# Patient Record
Sex: MALE | Race: BLACK OR AFRICAN AMERICAN | NOT HISPANIC OR LATINO | Employment: UNEMPLOYED | URBAN - METROPOLITAN AREA
[De-identification: names, ages, dates, MRNs, and addresses within clinical notes are randomized per-mention and may not be internally consistent; named-entity substitution may affect disease eponyms.]

---

## 2023-08-17 SDOH — HEALTH STABILITY: PHYSICAL HEALTH: ON AVERAGE, HOW MANY MINUTES DO YOU ENGAGE IN EXERCISE AT THIS LEVEL?: 20 MIN

## 2023-08-17 SDOH — ECONOMIC STABILITY: HOUSING INSECURITY
IN THE LAST 12 MONTHS, WAS THERE A TIME WHEN YOU DID NOT HAVE A STEADY PLACE TO SLEEP OR SLEPT IN A SHELTER (INCLUDING NOW)?: NO

## 2023-08-17 SDOH — ECONOMIC STABILITY: HOUSING INSECURITY
IN THE LAST 12 MONTHS, WAS THERE A TIME WHEN YOU DID NOT HAVE A STEADY PLACE TO SLEEP OR SLEPT IN A SHELTER (INCLUDING NOW)?: YES

## 2023-08-17 SDOH — ECONOMIC STABILITY: INCOME INSECURITY: IN THE LAST 12 MONTHS, WAS THERE A TIME WHEN YOU WERE NOT ABLE TO PAY THE MORTGAGE OR RENT ON TIME?: YES

## 2023-08-17 SDOH — ECONOMIC STABILITY: INCOME INSECURITY: HOW HARD IS IT FOR YOU TO PAY FOR THE VERY BASICS LIKE FOOD, HOUSING, MEDICAL CARE, AND HEATING?: VERY HARD

## 2023-08-17 SDOH — HEALTH STABILITY: PHYSICAL HEALTH: ON AVERAGE, HOW MANY DAYS PER WEEK DO YOU ENGAGE IN MODERATE TO STRENUOUS EXERCISE (LIKE A BRISK WALK)?: 4 DAYS

## 2023-08-17 SDOH — ECONOMIC STABILITY: TRANSPORTATION INSECURITY
IN THE PAST 12 MONTHS, HAS LACK OF TRANSPORTATION KEPT YOU FROM MEETINGS, WORK, OR FROM GETTING THINGS NEEDED FOR DAILY LIVING?: YES

## 2023-08-17 SDOH — ECONOMIC STABILITY: HOUSING INSECURITY: IN THE LAST 12 MONTHS, HOW MANY PLACES HAVE YOU LIVED?: 3

## 2023-08-17 SDOH — ECONOMIC STABILITY: TRANSPORTATION INSECURITY
IN THE PAST 12 MONTHS, HAS THE LACK OF TRANSPORTATION KEPT YOU FROM MEDICAL APPOINTMENTS OR FROM GETTING MEDICATIONS?: YES

## 2023-08-17 SDOH — ECONOMIC STABILITY: FOOD INSECURITY: WITHIN THE PAST 12 MONTHS, YOU WORRIED THAT YOUR FOOD WOULD RUN OUT BEFORE YOU GOT MONEY TO BUY MORE.: OFTEN TRUE

## 2023-08-17 SDOH — HEALTH STABILITY: MENTAL HEALTH
STRESS IS WHEN SOMEONE FEELS TENSE, NERVOUS, ANXIOUS, OR CAN'T SLEEP AT NIGHT BECAUSE THEIR MIND IS TROUBLED. HOW STRESSED ARE YOU?: TO SOME EXTENT

## 2023-08-17 SDOH — ECONOMIC STABILITY: TRANSPORTATION INSECURITY
IN THE PAST 12 MONTHS, HAS LACK OF RELIABLE TRANSPORTATION KEPT YOU FROM MEDICAL APPOINTMENTS, MEETINGS, WORK OR FROM GETTING THINGS NEEDED FOR DAILY LIVING?: YES

## 2023-08-17 SDOH — ECONOMIC STABILITY: FOOD INSECURITY: WITHIN THE PAST 12 MONTHS, THE FOOD YOU BOUGHT JUST DIDN'T LAST AND YOU DIDN'T HAVE MONEY TO GET MORE.: OFTEN TRUE

## 2023-08-17 ASSESSMENT — SOCIAL DETERMINANTS OF HEALTH (SDOH)
IN A TYPICAL WEEK, HOW MANY TIMES DO YOU TALK ON THE PHONE WITH FAMILY, FRIENDS, OR NEIGHBORS?: MORE THAN THREE TIMES A WEEK
HOW OFTEN DO YOU GET TOGETHER WITH FRIENDS OR RELATIVES?: THREE TIMES A WEEK
HOW OFTEN DO YOU ATTENT MEETINGS OF THE CLUB OR ORGANIZATION YOU BELONG TO?: NEVER
HOW OFTEN DO YOU ATTEND CHURCH OR RELIGIOUS SERVICES?: 1 TO 4 TIMES PER YEAR
HOW OFTEN DO YOU GET TOGETHER WITH FRIENDS OR RELATIVES?: THREE TIMES A WEEK
HOW OFTEN DO YOU ATTEND CHURCH OR RELIGIOUS SERVICES?: 1 TO 4 TIMES PER YEAR
HOW MANY DRINKS CONTAINING ALCOHOL DO YOU HAVE ON A TYPICAL DAY WHEN YOU ARE DRINKING: PATIENT DOES NOT DRINK
WITHIN THE PAST 12 MONTHS, YOU WORRIED THAT YOUR FOOD WOULD RUN OUT BEFORE YOU GOT THE MONEY TO BUY MORE: OFTEN TRUE
HOW OFTEN DO YOU ATTENT MEETINGS OF THE CLUB OR ORGANIZATION YOU BELONG TO?: NEVER
ARE YOU MARRIED, WIDOWED, DIVORCED, SEPARATED, NEVER MARRIED, OR LIVING WITH A PARTNER?: NEVER MARRIED
ARE YOU MARRIED, WIDOWED, DIVORCED, SEPARATED, NEVER MARRIED, OR LIVING WITH A PARTNER?: NEVER MARRIED
DO YOU BELONG TO ANY CLUBS OR ORGANIZATIONS SUCH AS CHURCH GROUPS UNIONS, FRATERNAL OR ATHLETIC GROUPS, OR SCHOOL GROUPS?: NO
HOW HARD IS IT FOR YOU TO PAY FOR THE VERY BASICS LIKE FOOD, HOUSING, MEDICAL CARE, AND HEATING?: VERY HARD
DO YOU BELONG TO ANY CLUBS OR ORGANIZATIONS SUCH AS CHURCH GROUPS UNIONS, FRATERNAL OR ATHLETIC GROUPS, OR SCHOOL GROUPS?: NO
HOW OFTEN DO YOU HAVE A DRINK CONTAINING ALCOHOL: NEVER
IN A TYPICAL WEEK, HOW MANY TIMES DO YOU TALK ON THE PHONE WITH FAMILY, FRIENDS, OR NEIGHBORS?: MORE THAN THREE TIMES A WEEK
HOW OFTEN DO YOU HAVE SIX OR MORE DRINKS ON ONE OCCASION: NEVER

## 2023-08-17 ASSESSMENT — LIFESTYLE VARIABLES
HOW MANY STANDARD DRINKS CONTAINING ALCOHOL DO YOU HAVE ON A TYPICAL DAY: PATIENT DOES NOT DRINK
HOW OFTEN DO YOU HAVE SIX OR MORE DRINKS ON ONE OCCASION: NEVER
SKIP TO QUESTIONS 9-10: 1
HOW OFTEN DO YOU HAVE A DRINK CONTAINING ALCOHOL: NEVER
AUDIT-C TOTAL SCORE: 0

## 2023-08-18 ENCOUNTER — PHARMACY VISIT (OUTPATIENT)
Dept: PHARMACY | Facility: MEDICAL CENTER | Age: 63
End: 2023-08-18
Payer: COMMERCIAL

## 2023-08-18 ENCOUNTER — OFFICE VISIT (OUTPATIENT)
Dept: MEDICAL GROUP | Facility: MEDICAL CENTER | Age: 63
End: 2023-08-18
Attending: NURSE PRACTITIONER
Payer: MEDICAID

## 2023-08-18 VITALS
TEMPERATURE: 97.7 F | RESPIRATION RATE: 16 BRPM | SYSTOLIC BLOOD PRESSURE: 132 MMHG | WEIGHT: 238.9 LBS | HEART RATE: 68 BPM | BODY MASS INDEX: 32.36 KG/M2 | HEIGHT: 72 IN | OXYGEN SATURATION: 96 % | DIASTOLIC BLOOD PRESSURE: 72 MMHG

## 2023-08-18 DIAGNOSIS — Z12.11 COLON CANCER SCREENING: ICD-10-CM

## 2023-08-18 DIAGNOSIS — Z13.29 SCREENING FOR ENDOCRINE, NUTRITIONAL, METABOLIC AND IMMUNITY DISORDER: ICD-10-CM

## 2023-08-18 DIAGNOSIS — Z13.0 SCREENING FOR ENDOCRINE, NUTRITIONAL, METABOLIC AND IMMUNITY DISORDER: ICD-10-CM

## 2023-08-18 DIAGNOSIS — I15.9 SECONDARY HYPERTENSION: ICD-10-CM

## 2023-08-18 DIAGNOSIS — M54.9 CHRONIC BACK PAIN, UNSPECIFIED BACK LOCATION, UNSPECIFIED BACK PAIN LATERALITY: ICD-10-CM

## 2023-08-18 DIAGNOSIS — Z11.3 ROUTINE SCREENING FOR STI (SEXUALLY TRANSMITTED INFECTION): ICD-10-CM

## 2023-08-18 DIAGNOSIS — E11.65 TYPE 2 DIABETES MELLITUS WITH HYPERGLYCEMIA, WITHOUT LONG-TERM CURRENT USE OF INSULIN (HCC): ICD-10-CM

## 2023-08-18 DIAGNOSIS — I50.9 HEART FAILURE, UNSPECIFIED HF CHRONICITY, UNSPECIFIED HEART FAILURE TYPE (HCC): ICD-10-CM

## 2023-08-18 DIAGNOSIS — Z76.89 ENCOUNTER TO ESTABLISH CARE: ICD-10-CM

## 2023-08-18 DIAGNOSIS — Z13.228 SCREENING FOR ENDOCRINE, NUTRITIONAL, METABOLIC AND IMMUNITY DISORDER: ICD-10-CM

## 2023-08-18 DIAGNOSIS — I25.2 MI, OLD: ICD-10-CM

## 2023-08-18 DIAGNOSIS — Z12.5 PROSTATE CANCER SCREENING: ICD-10-CM

## 2023-08-18 DIAGNOSIS — Z11.59 NEED FOR HEPATITIS C SCREENING TEST: ICD-10-CM

## 2023-08-18 DIAGNOSIS — Z13.6 SCREENING FOR CARDIOVASCULAR CONDITION: ICD-10-CM

## 2023-08-18 DIAGNOSIS — G89.29 CHRONIC BACK PAIN, UNSPECIFIED BACK LOCATION, UNSPECIFIED BACK PAIN LATERALITY: ICD-10-CM

## 2023-08-18 DIAGNOSIS — E78.5 HYPERLIPIDEMIA, UNSPECIFIED HYPERLIPIDEMIA TYPE: ICD-10-CM

## 2023-08-18 DIAGNOSIS — Z13.21 SCREENING FOR ENDOCRINE, NUTRITIONAL, METABOLIC AND IMMUNITY DISORDER: ICD-10-CM

## 2023-08-18 PROCEDURE — RXMED WILLOW AMBULATORY MEDICATION CHARGE: Performed by: NURSE PRACTITIONER

## 2023-08-18 PROCEDURE — 99204 OFFICE O/P NEW MOD 45 MIN: CPT | Performed by: NURSE PRACTITIONER

## 2023-08-18 PROCEDURE — 3078F DIAST BP <80 MM HG: CPT | Performed by: NURSE PRACTITIONER

## 2023-08-18 PROCEDURE — 99202 OFFICE O/P NEW SF 15 MIN: CPT | Performed by: NURSE PRACTITIONER

## 2023-08-18 PROCEDURE — 3075F SYST BP GE 130 - 139MM HG: CPT | Performed by: NURSE PRACTITIONER

## 2023-08-18 RX ORDER — METFORMIN HYDROCHLORIDE 500 MG/1
500 TABLET, EXTENDED RELEASE ORAL DAILY
COMMUNITY
End: 2023-08-18

## 2023-08-18 RX ORDER — ATORVASTATIN CALCIUM 20 MG/1
40 TABLET, FILM COATED ORAL NIGHTLY
COMMUNITY
End: 2023-08-18 | Stop reason: SDUPTHER

## 2023-08-18 RX ORDER — CARVEDILOL 12.5 MG/1
12.5 TABLET ORAL 2 TIMES DAILY WITH MEALS
Qty: 60 TABLET | Refills: 1 | Status: SHIPPED | OUTPATIENT
Start: 2023-08-18

## 2023-08-18 RX ORDER — MELOXICAM 7.5 MG/1
15 TABLET ORAL DAILY
Qty: 30 TABLET | Refills: 1 | Status: SHIPPED | OUTPATIENT
Start: 2023-08-18 | End: 2023-12-05

## 2023-08-18 RX ORDER — CARVEDILOL 12.5 MG/1
12.5 TABLET ORAL 2 TIMES DAILY WITH MEALS
COMMUNITY
End: 2023-08-18 | Stop reason: SDUPTHER

## 2023-08-18 RX ORDER — ATORVASTATIN CALCIUM 40 MG/1
40 TABLET, FILM COATED ORAL NIGHTLY
Qty: 15 TABLET | Refills: 1 | Status: SHIPPED | OUTPATIENT
Start: 2023-08-18

## 2023-08-18 RX ORDER — FUROSEMIDE 40 MG/1
40 TABLET ORAL DAILY
COMMUNITY
End: 2023-08-18 | Stop reason: SDUPTHER

## 2023-08-18 RX ORDER — POTASSIUM CHLORIDE 20 MEQ/1
20 TABLET, EXTENDED RELEASE ORAL 2 TIMES DAILY
Qty: 60 TABLET | Refills: 1 | Status: SHIPPED | OUTPATIENT
Start: 2023-08-18

## 2023-08-18 RX ORDER — MELOXICAM 7.5 MG/1
15 TABLET ORAL DAILY
COMMUNITY
End: 2023-08-18 | Stop reason: SDUPTHER

## 2023-08-18 RX ORDER — FUROSEMIDE 40 MG/1
40 TABLET ORAL DAILY
Qty: 30 TABLET | Refills: 1 | Status: SHIPPED | OUTPATIENT
Start: 2023-08-18

## 2023-08-18 RX ORDER — POTASSIUM CHLORIDE 20 MEQ/1
20 TABLET, EXTENDED RELEASE ORAL 2 TIMES DAILY
COMMUNITY
End: 2023-08-18 | Stop reason: SDUPTHER

## 2023-08-18 RX ORDER — EMPAGLIFLOZIN 25 MG/1
1 TABLET, FILM COATED ORAL DAILY
Qty: 30 TABLET | Refills: 2 | Status: SHIPPED | OUTPATIENT
Start: 2023-08-18 | End: 2023-12-29 | Stop reason: SDUPTHER

## 2023-08-18 ASSESSMENT — PATIENT HEALTH QUESTIONNAIRE - PHQ9: CLINICAL INTERPRETATION OF PHQ2 SCORE: 0

## 2023-08-18 NOTE — LETTER
Formerly [facility]  EDWIN GuerreroRSIDDHARTHA.  [address]  [place] [address]  Fax: [phone]   Authorization for Release/Disclosure of   Protected Health Information   Name: BONIFACIO URBINA : 1960 SSN: [id number]   Address: [address] [place] [address] [address]  [facility] [address] Phone:    [phone] (home)    I authorize the entity listed below to release/disclose the PHI below to:   [facility]/YEE Guerrero.RTERRI and THA Guerrero   Provider or Entity Name:     Address   City, State, [facility] Phone:  [phone]    Fax:     Reason for request: continuity of care   Information to be released:    [  ] LAST COLONOSCOPY,  including any PATH REPORT and follow-up  [  ] LAST FIT/COLOGUARD RESULT [  ] LAST DEXA  [  ] LAST MAMMOGRAM  [  ] LAST PAP  [  ] LAST LABS [  ] RETINA EXAM REPORT  [  ] IMMUNIZATION RECORDS  [  ] Release all info      [  ] Check here and initial the line next to each item to release ALL health information INCLUDING  _____ Care and treatment for drug and / or alcohol abuse  _____ HIV testing, infection status, or AIDS  _____ Genetic Testing    DATES OF SERVICE OR TIME PERIOD TO BE DISCLOSED: _____________  I understand and acknowledge that:  * This Authorization may be revoked at any time by you in writing, except if your health information has already been used or disclosed.  * Your health information that will be used or disclosed as a result of you signing this authorization could be re-disclosed by the recipient. If this occurs, your re-disclosed health information may no longer be protected by State or Federal laws.  * You may refuse to sign this Authorization. Your refusal will not affect your ability to obtain treatment.  * This Authorization becomes effective upon signing and will  on (date) __________.      If no date is indicated, this Authorization will  one (1) year from the signature date.    Name: Bonifacio Urbina  Signature: Date:    8/18/2023     PLEASE FAX REQUESTED RECORDS BACK TO: [phone]

## 2023-08-18 NOTE — LETTER
Formerly [facility]  EDWIN GuerreroRSIDDHARTHA.  [address]  [place] [address]  Fax: [phone]   Authorization for Release/Disclosure of   Protected Health Information   Name: BONIFACIO URBINA : 1960 SSN: [id number]   Address: [address] [place] [address] [address]  [facility] [address] Phone:    [phone] (home)    I authorize the entity listed below to release/disclose the PHI below to:   [facility]/ARJUN Guerrero.P.RTERRI and THA Guerrero   Provider or Entity Name:  Dr. Wright - Cardiology    Address   City, [facility], [place], [place]  Phone:      Fax:     Reason for request: continuity of care   Information to be released:    [  ] LAST COLONOSCOPY,  including any PATH REPORT and follow-up  [  ] LAST FIT/COLOGUARD RESULT [  ] LAST DEXA  [  ] LAST MAMMOGRAM  [  ] LAST PAP  [  ] LAST LABS [  ] RETINA EXAM REPORT  [  ] IMMUNIZATION RECORDS  [  ] Release all info      [  ] Check here and initial the line next to each item to release ALL health information INCLUDING  _____ Care and treatment for drug and / or alcohol abuse  _____ HIV testing, infection status, or AIDS  _____ Genetic Testing    DATES OF SERVICE OR TIME PERIOD TO BE DISCLOSED: _____________  I understand and acknowledge that:  * This Authorization may be revoked at any time by you in writing, except if your health information has already been used or disclosed.  * Your health information that will be used or disclosed as a result of you signing this authorization could be re-disclosed by the recipient. If this occurs, your re-disclosed health information may no longer be protected by State or Federal laws.  * You may refuse to sign this Authorization. Your refusal will not affect your ability to obtain treatment.  * This Authorization becomes effective upon signing and will  on (date) __________.      If no date is indicated, this Authorization will  one (1) year from the signature date.    Name: Bonifacio RENTERIA  Bryan  Signature: Date:   8/18/2023     PLEASE FAX REQUESTED RECORDS BACK TO: [phone]

## 2023-08-18 NOTE — LETTER
[facility]  EDWIN GuerreroRSIDDHARTHA.  [address]  [place] [address]  Fax: [phone]   Authorization for Release/Disclosure of   Protected Health Information   Name: BONIFACIO URBINA : 1960 SSN: [id number]   Address: [address] [place] [address] [address]  [place] [address] Phone:    [phone] (home)    I authorize the entity listed below to release/disclose the PHI below to:   [facility]/ARJUN Guerrero.HARINDER.RTERRI and THA Guerrero   Provider or Entity Name:  ROSARIO Eugene   Address   City, [facility] Phone:  [phone]    Fax:     Reason for request: continuity of care   Information to be released:    [  ] LAST COLONOSCOPY,  including any PATH REPORT and follow-up  [  ] LAST FIT/COLOGUARD RESULT [  ] LAST DEXA  [  ] LAST MAMMOGRAM  [  ] LAST PAP  [  ] LAST LABS [  ] RETINA EXAM REPORT  [  ] IMMUNIZATION RECORDS  [  ] Release all info      [  ] Check here and initial the line next to each item to release ALL health information INCLUDING  _____ Care and treatment for drug and / or alcohol abuse  _____ HIV testing, infection status, or AIDS  _____ Genetic Testing    DATES OF SERVICE OR TIME PERIOD TO BE DISCLOSED: _____________  I understand and acknowledge that:  * This Authorization may be revoked at any time by you in writing, except if your health information has already been used or disclosed.  * Your health information that will be used or disclosed as a result of you signing this authorization could be re-disclosed by the recipient. If this occurs, your re-disclosed health information may no longer be protected by State or Federal laws.  * You may refuse to sign this Authorization. Your refusal will not affect your ability to obtain treatment.  * This Authorization becomes effective upon signing and will  on (date) __________.      If no date is indicated, this Authorization will  one (1) year from the signature date.    Name: Bonifacio Urbina  Signature:  Date:   8/18/2023     PLEASE FAX REQUESTED RECORDS BACK TO: [phone]

## 2023-08-28 ENCOUNTER — TELEPHONE (OUTPATIENT)
Dept: CARDIOLOGY | Facility: MEDICAL CENTER | Age: 63
End: 2023-08-28
Payer: MEDICAID

## 2023-08-28 NOTE — TELEPHONE ENCOUNTER
Spoke to patient in regards to records for NP appointment with [facility].     Patient has seen a cardiologist before?  Yes   If yes, where?: [facility]    Any recent cardiac testing outside of [facility]?  YES   What testing: EKG, ECHO   Where was it completed?: [facility], PATIENT NOT SURE  Were any records requested?  Yes   Fax:

## 2023-08-30 PROCEDURE — RXMED WILLOW AMBULATORY MEDICATION CHARGE: Performed by: NURSE PRACTITIONER

## 2023-08-31 ENCOUNTER — HOSPITAL ENCOUNTER (OUTPATIENT)
Dept: LAB | Facility: MEDICAL CENTER | Age: 63
End: 2023-08-31
Attending: NURSE PRACTITIONER
Payer: MEDICAID

## 2023-08-31 DIAGNOSIS — Z12.5 PROSTATE CANCER SCREENING: ICD-10-CM

## 2023-08-31 DIAGNOSIS — Z13.228 SCREENING FOR ENDOCRINE, NUTRITIONAL, METABOLIC AND IMMUNITY DISORDER: ICD-10-CM

## 2023-08-31 DIAGNOSIS — Z13.21 SCREENING FOR ENDOCRINE, NUTRITIONAL, METABOLIC AND IMMUNITY DISORDER: ICD-10-CM

## 2023-08-31 DIAGNOSIS — E11.65 TYPE 2 DIABETES MELLITUS WITH HYPERGLYCEMIA, WITHOUT LONG-TERM CURRENT USE OF INSULIN (HCC): ICD-10-CM

## 2023-08-31 DIAGNOSIS — Z11.59 NEED FOR HEPATITIS C SCREENING TEST: ICD-10-CM

## 2023-08-31 DIAGNOSIS — Z13.29 SCREENING FOR ENDOCRINE, NUTRITIONAL, METABOLIC AND IMMUNITY DISORDER: ICD-10-CM

## 2023-08-31 DIAGNOSIS — Z13.0 SCREENING FOR ENDOCRINE, NUTRITIONAL, METABOLIC AND IMMUNITY DISORDER: ICD-10-CM

## 2023-08-31 DIAGNOSIS — Z11.3 ROUTINE SCREENING FOR STI (SEXUALLY TRANSMITTED INFECTION): ICD-10-CM

## 2023-08-31 LAB
25(OH)D3 SERPL-MCNC: 14 NG/ML (ref 30–100)
EST. AVERAGE GLUCOSE BLD GHB EST-MCNC: 160 MG/DL
HBA1C MFR BLD: 7.2 % (ref 4–5.6)
HCV AB SER QL: REACTIVE
HIV 1+2 AB+HIV1 P24 AG SERPL QL IA: NORMAL
PSA SERPL-MCNC: 0.35 NG/ML (ref 0–4)
T4 FREE SERPL-MCNC: 1.19 NG/DL (ref 0.93–1.7)
TSH SERPL DL<=0.005 MIU/L-ACNC: 2.48 UIU/ML (ref 0.38–5.33)

## 2023-08-31 PROCEDURE — 86803 HEPATITIS C AB TEST: CPT

## 2023-08-31 PROCEDURE — 84443 ASSAY THYROID STIM HORMONE: CPT

## 2023-08-31 PROCEDURE — 36415 COLL VENOUS BLD VENIPUNCTURE: CPT

## 2023-08-31 PROCEDURE — 84153 ASSAY OF PSA TOTAL: CPT

## 2023-08-31 PROCEDURE — 87389 HIV-1 AG W/HIV-1&-2 AB AG IA: CPT

## 2023-08-31 PROCEDURE — 84439 ASSAY OF FREE THYROXINE: CPT

## 2023-08-31 PROCEDURE — 83036 HEMOGLOBIN GLYCOSYLATED A1C: CPT

## 2023-08-31 PROCEDURE — 87522 HEPATITIS C REVRS TRNSCRPJ: CPT

## 2023-08-31 PROCEDURE — 82306 VITAMIN D 25 HYDROXY: CPT

## 2023-09-01 ENCOUNTER — PHARMACY VISIT (OUTPATIENT)
Dept: PHARMACY | Facility: MEDICAL CENTER | Age: 63
End: 2023-09-01
Payer: COMMERCIAL

## 2023-09-01 ENCOUNTER — OFFICE VISIT (OUTPATIENT)
Dept: CARDIOLOGY | Facility: MEDICAL CENTER | Age: 63
End: 2023-09-01
Attending: NURSE PRACTITIONER
Payer: MEDICAID

## 2023-09-01 VITALS
RESPIRATION RATE: 16 BRPM | HEIGHT: 72 IN | DIASTOLIC BLOOD PRESSURE: 82 MMHG | WEIGHT: 240 LBS | BODY MASS INDEX: 32.51 KG/M2 | HEART RATE: 68 BPM | SYSTOLIC BLOOD PRESSURE: 128 MMHG | OXYGEN SATURATION: 96 %

## 2023-09-01 DIAGNOSIS — I50.20 HEART FAILURE WITH REDUCED EJECTION FRACTION (HCC): ICD-10-CM

## 2023-09-01 DIAGNOSIS — I10 ESSENTIAL HYPERTENSION, BENIGN: ICD-10-CM

## 2023-09-01 DIAGNOSIS — E78.5 DYSLIPIDEMIA: ICD-10-CM

## 2023-09-01 DIAGNOSIS — E55.9 VITAMIN D DEFICIENCY: ICD-10-CM

## 2023-09-01 DIAGNOSIS — I25.10 CORONARY ARTERY DISEASE INVOLVING NATIVE CORONARY ARTERY OF NATIVE HEART WITHOUT ANGINA PECTORIS: ICD-10-CM

## 2023-09-01 DIAGNOSIS — I50.9 CONGESTIVE HEART FAILURE, UNSPECIFIED HF CHRONICITY, UNSPECIFIED HEART FAILURE TYPE (HCC): ICD-10-CM

## 2023-09-01 PROCEDURE — 99212 OFFICE O/P EST SF 10 MIN: CPT | Performed by: INTERNAL MEDICINE

## 2023-09-01 PROCEDURE — 94618 PULMONARY STRESS TESTING: CPT | Performed by: INTERNAL MEDICINE

## 2023-09-01 PROCEDURE — RXMED WILLOW AMBULATORY MEDICATION CHARGE

## 2023-09-01 PROCEDURE — 3074F SYST BP LT 130 MM HG: CPT | Performed by: INTERNAL MEDICINE

## 2023-09-01 PROCEDURE — 94618 PULMONARY STRESS TESTING: CPT | Mod: 26 | Performed by: INTERNAL MEDICINE

## 2023-09-01 PROCEDURE — 99214 OFFICE O/P EST MOD 30 MIN: CPT | Mod: 25 | Performed by: INTERNAL MEDICINE

## 2023-09-01 PROCEDURE — 99204 OFFICE O/P NEW MOD 45 MIN: CPT | Mod: 25 | Performed by: INTERNAL MEDICINE

## 2023-09-01 PROCEDURE — 93005 ELECTROCARDIOGRAM TRACING: CPT | Mod: XE | Performed by: INTERNAL MEDICINE

## 2023-09-01 PROCEDURE — 3079F DIAST BP 80-89 MM HG: CPT | Performed by: INTERNAL MEDICINE

## 2023-09-01 RX ORDER — ERGOCALCIFEROL 1.25 MG/1
50000 CAPSULE ORAL
Qty: 12 CAPSULE | Refills: 0 | Status: SHIPPED | OUTPATIENT
Start: 2023-09-01 | End: 2023-12-05

## 2023-09-01 RX ORDER — SACUBITRIL AND VALSARTAN 49; 51 MG/1; MG/1
TABLET, FILM COATED ORAL
COMMUNITY
Start: 2023-08-03

## 2023-09-01 ASSESSMENT — MINNESOTA LIVING WITH HEART FAILURE QUESTIONNAIRE (MLHF)
EATING LESS FOODS YOU LIKE: 5
WALKING ABOUT OR CLIMBING STAIRS DIFFICULT: 4
DIFFICULTY GOING AWAY FROM HOME: 4
DIFFICULTY WITH SEXUAL ACTIVITIES: 5
LOSS OF SELF CONTROL IN YOUR LIFE: 5
SWELLING IN ANKLES OR LEGS: 4
DIFFICULTY SLEEPING WELL AT NIGHT: 2
TIRED, FATIGUED OR LOW ON ENERGY: 3
DIFFICULTY WITH RECREATIONAL PASTIMES, SPORTS, HOBBIES: 5
DIFFICULTY WORKING TO EARN A LIVING: 5
MAKING YOU STAY IN A HOSPITAL: 1
HAVING TO SIT OR LIE DOWN DURING THE DAY: 4
FEELING LIKE A BURDEN TO FAMILY AND FRIENDS: 4
DIFFICULTY TO CONCENTRATE OR REMEMBERING THINGS: 4
TOTAL_SCORE: 76
MAKING YOU WORRY: 5
MAKING YOU SHORT OF BREATH: 2
GIVING YOU SIDE EFFECTS FROM TREATMENTS: 4
WORKING AROUND THE HOUSE OR YARD DIFFICULT: 4
COSTING YOU MONEY FOR MEDICAL CARE: 0
DIFFICULTY SOCIALIZING WITH FAMILY OR FRIENDS: 2
MAKING YOU FEEL DEPRESSED: 4

## 2023-09-01 ASSESSMENT — 6 MINUTE WALK TEST (6MWT): TOTAL DISTANCE WALKED (METERS): 299
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
## 2023-09-01 NOTE — RESULT ENCOUNTER NOTE
Edis Valdovinos,  Your labs show that your vitamin D level is low.  Your current level is 14 and we consider normal to be 30 or above. I recommend you start taking a prescription high dose vitamin D supplement once a week.  I have sent this over to your pharmacy for you.     Your Hemoglobin A1c was 7.2, please continue to take you Jardiance as prescribed.    Your Hepatitis C Virus Antibody screening resulted as reactive. Which means that you have been exposed to the virus at some point. The testing on you blood is still in process to determine if your body has cleared the virus or if its an active infection. The results should be back in time for your appointment on 9/18/2023.    If you have any additional questions please follow up with you PCP.     YAN Simpson.

## 2023-09-02 NOTE — ASSESSMENT & PLAN NOTE
Regarding his coronary artery disease he remains on appropriate medical therapy.  No changes are made to his medical therapy at this time.  Patient is taking an aspirin therapy at this time

## 2023-09-02 NOTE — ASSESSMENT & PLAN NOTE
Clinically, he is doing fair and is well compensated in terms of heart failure with reduced ejection fraction.  He notes that he had a prior myocardial infarction back in March 2022 and has not had further imaging.  I have recommended that he undergo an echocardiogram given that he remains on goal-directed medical therapy for his presumptive heart failure with reduced ejection fraction.  We will try and procure records from [facility] regarding his prior myocardial infarction echocardiogram.  He is amenable to his current course of care and further recommendations will follow.  He will continue his current medications at scheduled dosing.  It would not be unreasonable to consider addition of spironolactone pending his echocardiogram.

## 2023-09-02 NOTE — ASSESSMENT & PLAN NOTE
Regarding his essential hypertension no changes were made to his medical therapy given that his blood pressures currently stable

## 2023-09-02 NOTE — ASSESSMENT & PLAN NOTE
Recommend continuation of moderate intensity statin therapy given known history of nonobstructive coronary artery disease

## 2023-09-03 LAB
HCV RNA SERPL NAA+PROBE-ACNC: NOT DETECTED IU/ML
HCV RNA SERPL NAA+PROBE-LOG IU: NOT DETECTED LOG IU/ML
HCV RNA SERPL QL NAA+PROBE: NOT DETECTED

## 2023-09-04 PROBLEM — Z76.89 ENCOUNTER TO ESTABLISH CARE: Status: ACTIVE | Noted: 2023-09-04

## 2023-09-04 LAB — EKG IMPRESSION: NORMAL

## 2023-09-04 PROCEDURE — 93010 ELECTROCARDIOGRAM REPORT: CPT | Performed by: INTERNAL MEDICINE

## 2023-09-04 NOTE — PROGRESS NOTES
Chief Complaint   Patient presents with    Establish Care       Subjective:     HPI:   Bonifacio Adams is a 62 y.o. male here to discuss the evaluation and management of:        Problem   Encounter to Establish Care    Patient here to establish care.  Has known history of coronary artery disease, dyslipidemia, hypertension, and heart failure with reduced ejection fraction.  Patient states its been a little while since he seen a cardiologist.  Patient was previously receiving care in [place] and is now moved to this area.  Patient is requesting refills on his medications.         ROS  See HPI       Allergies   Allergen Reactions    Sulfa Drugs        Current medicines (including changes today)  Current Outpatient Medications   Medication Sig Dispense Refill    atorvastatin (LIPITOR) 40 MG Tab Take 1 Tablet by mouth every evening. 15 Tablet 1    carvedilol (COREG) 12.5 MG Tab Take 1 Tablet by mouth 2 times a day with meals. 60 Tablet 1    furosemide (LASIX) 40 MG Tab Take 1 Tablet by mouth every day. 30 Tablet 1    meloxicam (MOBIC) 7.5 MG Tab Take 2 Tablets by mouth every day. 30 Tablet 1    Empagliflozin (JARDIANCE) 25 MG Tab Take 1 Tablet by mouth every day. 30 Tablet 2    potassium chloride SA (KDUR) 20 MEQ Tab CR Take 1 Tablet by mouth 2 times a day. 60 Tablet 1    ergocalciferol (DRISDOL) 62547 UNIT capsule Take 1 Capsule by mouth every 7 days. (Patient not taking: Reported on 9/1/2023) 12 Capsule 0    ENTRESTO 49-51 MG Tab        No current facility-administered medications for this visit.       Social History     Tobacco Use    Smoking status: Former     Types: Cigarettes    Smokeless tobacco: Former   Vaping Use    Vaping Use: Former   Substance Use Topics    Alcohol use: Not Currently    Drug use: Never       Patient Active Problem List    Diagnosis Date Noted    Encounter to establish care 09/04/2023    Heart failure with reduced ejection fraction (HCC) 09/01/2023    Coronary artery disease involving  native coronary artery of native heart without angina pectoris 09/01/2023    Essential hypertension, benign 09/01/2023    Dyslipidemia 09/01/2023       Family History   Problem Relation Age of Onset    Diabetes Mother     Diabetes Sister           Objective:     /72 (BP Location: Left arm, Patient Position: Sitting, BP Cuff Size: Adult)   Pulse 68   Temp 36.5 °C (97.7 °F) (Temporal)   Resp 16   Ht 1.829 m (6')   Wt 108 kg (238 lb 14.4 oz)   SpO2 96%  Body mass index is 32.4 kg/m².    Physical Exam:  Physical Exam  Vitals reviewed.   Constitutional:       General: He is awake.      Appearance: Normal appearance. He is well-developed.   HENT:      Head: Normocephalic.   Eyes:      Conjunctiva/sclera: Conjunctivae normal.   Cardiovascular:      Rate and Rhythm: Normal rate and regular rhythm.      Heart sounds: Normal heart sounds.   Pulmonary:      Effort: Pulmonary effort is normal. No respiratory distress.      Breath sounds: Normal breath sounds. No wheezing.   Musculoskeletal:      Cervical back: Neck supple.   Skin:     General: Skin is warm and dry.   Neurological:      Mental Status: He is alert and oriented to person, place, and time.   Psychiatric:         Mood and Affect: Mood normal.         Behavior: Behavior normal. Behavior is cooperative.         Assessment and Plan:     The following treatment plan was discussed:    Problem List Items Addressed This Visit       Encounter to establish care     Discussed health history and maintenance   Flu vaccine - Not available   Colon Ca screening - Referral to GI for Colonoscopy  Preventative screening labs ordered -patient to go have his labs completed  We will refill current medications for 1 month only until we can get updated labs and patient referred to cardiology.  Referral to cardiology placed  Blood pressures well controlled in office today so we will continue with current medications  Medical records request has been sent to his [place]  providers  Patient return in 1 month to go over labs and adjust medications as necessary.              Other Visit Diagnoses       Colon cancer screening        Relevant Orders    OCCULT BLOOD FECES IMMUNOASSAY    Routine screening for STI (sexually transmitted infection)        Relevant Orders    HIV AG/AB COMBO ASSAY SCREENING (Completed)    Need for hepatitis C screening test        Relevant Orders    HEP C VIRUS ANTIBODY (Completed)    Screening for cardiovascular condition        Screening for endocrine, nutritional, metabolic and immunity disorder        Relevant Orders    VITAMIN D,25 HYDROXY (DEFICIENCY) (Completed)    TSH (Completed)    FREE THYROXINE (Completed)    Prostate cancer screening        Relevant Orders    PROSTATE SPECIFIC AG SCREENING (Completed)    Type 2 diabetes mellitus with hyperglycemia, without long-term current use of insulin (HCC)        Relevant Medications    Empagliflozin (JARDIANCE) 25 MG Tab    Other Relevant Orders    HEMOGLOBIN A1C (Completed)    MI, old        Relevant Medications    atorvastatin (LIPITOR) 40 MG Tab    carvedilol (COREG) 12.5 MG Tab    furosemide (LASIX) 40 MG Tab    Other Relevant Orders    REFERRAL TO CARDIOLOGY    Hyperlipidemia, unspecified hyperlipidemia type        Relevant Medications    atorvastatin (LIPITOR) 40 MG Tab    carvedilol (COREG) 12.5 MG Tab    furosemide (LASIX) 40 MG Tab    Secondary hypertension        Relevant Medications    atorvastatin (LIPITOR) 40 MG Tab    carvedilol (COREG) 12.5 MG Tab    furosemide (LASIX) 40 MG Tab    Heart failure, unspecified HF chronicity, unspecified heart failure type (HCC)        Relevant Medications    atorvastatin (LIPITOR) 40 MG Tab    carvedilol (COREG) 12.5 MG Tab    furosemide (LASIX) 40 MG Tab    potassium chloride SA (KDUR) 20 MEQ Tab CR    Other Relevant Orders    REFERRAL TO CARDIOLOGY    Chronic back pain, unspecified back location, unspecified back pain laterality        Relevant Medications     meloxicam (MOBIC) 7.5 MG Tab            Any change or worsening of signs or symptoms, patient encouraged to follow-up or report to emergency room for further evaluation. Patient verbalizes understanding and agrees.    Follow-Up: Follow up in one month      PLEASE NOTE: This dictation was created using voice recognition software. I have made every reasonable attempt to correct obvious errors, but I expect that there are errors of grammar and possibly content that I did not discover before finalizing the note.

## 2023-09-04 NOTE — ASSESSMENT & PLAN NOTE
Discussed health history and maintenance   Flu vaccine - Not available   Colon Ca screening - Referral to GI for Colonoscopy  Preventative screening labs ordered -patient to go have his labs completed  We will refill current medications for 1 month only until we can get updated labs and patient referred to cardiology.  Referral to cardiology placed  Blood pressures well controlled in office today so we will continue with current medications  Medical records request has been sent to his [place] providers  Patient return in 1 month to go over labs and adjust medications as necessary.

## 2023-09-19 ENCOUNTER — OFFICE VISIT (OUTPATIENT)
Dept: MEDICAL GROUP | Facility: MEDICAL CENTER | Age: 63
End: 2023-09-19
Attending: NURSE PRACTITIONER
Payer: MEDICAID

## 2023-09-19 ENCOUNTER — PHARMACY VISIT (OUTPATIENT)
Dept: PHARMACY | Facility: MEDICAL CENTER | Age: 63
End: 2023-09-19
Payer: COMMERCIAL

## 2023-09-19 ENCOUNTER — HOSPITAL ENCOUNTER (OUTPATIENT)
Facility: MEDICAL CENTER | Age: 63
End: 2023-09-19
Attending: NURSE PRACTITIONER
Payer: MEDICAID

## 2023-09-19 VITALS
HEART RATE: 70 BPM | HEIGHT: 72 IN | RESPIRATION RATE: 17 BRPM | TEMPERATURE: 97.9 F | OXYGEN SATURATION: 95 % | WEIGHT: 247.7 LBS | SYSTOLIC BLOOD PRESSURE: 116 MMHG | DIASTOLIC BLOOD PRESSURE: 66 MMHG | BODY MASS INDEX: 33.55 KG/M2

## 2023-09-19 DIAGNOSIS — E78.5 DYSLIPIDEMIA: ICD-10-CM

## 2023-09-19 DIAGNOSIS — Z12.11 COLON CANCER SCREENING: ICD-10-CM

## 2023-09-19 DIAGNOSIS — I50.20 HEART FAILURE WITH REDUCED EJECTION FRACTION (HCC): ICD-10-CM

## 2023-09-19 DIAGNOSIS — E78.5 HYPERLIPIDEMIA, UNSPECIFIED HYPERLIPIDEMIA TYPE: ICD-10-CM

## 2023-09-19 DIAGNOSIS — E11.65 TYPE 2 DIABETES MELLITUS WITH HYPERGLYCEMIA, WITHOUT LONG-TERM CURRENT USE OF INSULIN (HCC): ICD-10-CM

## 2023-09-19 LAB — AMBIGUOUS DTTM AMBI4: NORMAL

## 2023-09-19 PROCEDURE — 99214 OFFICE O/P EST MOD 30 MIN: CPT | Performed by: NURSE PRACTITIONER

## 2023-09-19 PROCEDURE — RXMED WILLOW AMBULATORY MEDICATION CHARGE: Performed by: NURSE PRACTITIONER

## 2023-09-19 PROCEDURE — 3078F DIAST BP <80 MM HG: CPT | Performed by: NURSE PRACTITIONER

## 2023-09-19 PROCEDURE — 99213 OFFICE O/P EST LOW 20 MIN: CPT | Performed by: NURSE PRACTITIONER

## 2023-09-19 PROCEDURE — 82570 ASSAY OF URINE CREATININE: CPT

## 2023-09-19 PROCEDURE — 3074F SYST BP LT 130 MM HG: CPT | Performed by: NURSE PRACTITIONER

## 2023-09-19 PROCEDURE — 82043 UR ALBUMIN QUANTITATIVE: CPT

## 2023-09-19 RX ORDER — ORAL SEMAGLUTIDE 3 MG/1
3 TABLET ORAL DAILY
Qty: 30 TABLET | Refills: 6 | Status: SHIPPED | OUTPATIENT
Start: 2023-09-19

## 2023-09-20 ENCOUNTER — TELEPHONE (OUTPATIENT)
Dept: MEDICAL GROUP | Facility: MEDICAL CENTER | Age: 63
End: 2023-09-20
Payer: MEDICAID

## 2023-09-20 LAB
CREAT UR-MCNC: 209.21 MG/DL
MICROALBUMIN UR-MCNC: 4.4 MG/DL
MICROALBUMIN/CREAT UR: 21 MG/G (ref 0–30)

## 2023-09-20 NOTE — TELEPHONE ENCOUNTER
DOCUMENTATION OF PAR STATUS:    1. Name of Medication & Dose:  Semaglutide (RYBELSUS) 3 MG Tab      2. Name of Prescription Coverage Company & phone #: [place] [employer].    3. Date Prior Auth Submitted: 9/20/23    4. What information was given to obtain insurance decision? Chart notes, icd-10 code, med hx.    5. Prior Auth Status? Pending    6. Patient Notified: N\A

## 2023-09-26 ENCOUNTER — TELEPHONE (OUTPATIENT)
Dept: MEDICAL GROUP | Facility: MEDICAL CENTER | Age: 63
End: 2023-09-26
Payer: MEDICAID

## 2023-09-26 NOTE — TELEPHONE ENCOUNTER
FINAL PRIOR AUTHORIZATION STATUS:    1.  Name of Medication & Dose:  Semaglutide (RYBELSUS) 3 MG Tab       2. Prior Auth Status: Denied.  Reason: scanned under media manger.    3. Action Taken: Pharmacy Notified: N\A Patient Notified: N\A

## 2023-09-27 PROBLEM — E11.65 TYPE 2 DIABETES MELLITUS WITH HYPERGLYCEMIA, WITHOUT LONG-TERM CURRENT USE OF INSULIN (HCC): Status: ACTIVE | Noted: 2023-09-27

## 2023-09-27 NOTE — PROGRESS NOTES
Chief Complaint   Patient presents with    Lab Results       Subjective:     HPI:   Bonifacio Adams is a 63 y.o. male here to discuss the evaluation and management of:      Problem   Type 2 Diabetes Mellitus With Hyperglycemia, Without Long-Term Current Use of Insulin (Hcc)    Patient here for follow up on diabetes. Had his labs drawn recently. He had been on Jardiance as mono therapy as he was not able to handle the side effects associated with metformin. States he is doing well with his diet but does have an occasional treat.          ROS  See HPI     Allergies   Allergen Reactions    Sulfa Drugs        Current medicines (including changes today)  Current Outpatient Medications   Medication Sig Dispense Refill    Semaglutide (RYBELSUS) 3 MG Tab Take 1 tab (3 mg) by mouth every day at 6 PM. 30 Tablet 6    ergocalciferol (DRISDOL) 84210 UNIT capsule Take 1 Capsule by mouth every 7 days. (Patient not taking: Reported on 9/1/2023) 12 Capsule 0    ENTRESTO 49-51 MG Tab       atorvastatin (LIPITOR) 40 MG Tab Take 1 Tablet by mouth every evening. 15 Tablet 1    carvedilol (COREG) 12.5 MG Tab Take 1 Tablet by mouth 2 times a day with meals. 60 Tablet 1    furosemide (LASIX) 40 MG Tab Take 1 Tablet by mouth every day. 30 Tablet 1    meloxicam (MOBIC) 7.5 MG Tab Take 2 Tablets by mouth every day. 30 Tablet 1    Empagliflozin (JARDIANCE) 25 MG Tab Take 1 Tablet by mouth every day. 30 Tablet 2    potassium chloride SA (KDUR) 20 MEQ Tab CR Take 1 Tablet by mouth 2 times a day. 60 Tablet 1     No current facility-administered medications for this visit.       Social History     Tobacco Use    Smoking status: Former     Types: Cigarettes    Smokeless tobacco: Former   Vaping Use    Vaping Use: Former   Substance Use Topics    Alcohol use: Not Currently    Drug use: Never       Patient Active Problem List    Diagnosis Date Noted    Type 2 diabetes mellitus with hyperglycemia, without long-term current use of insulin (HCC)  09/27/2023    Encounter to establish care 09/04/2023    Heart failure with reduced ejection fraction (HCC) 09/01/2023    Coronary artery disease involving native coronary artery of native heart without angina pectoris 09/01/2023    Essential hypertension, benign 09/01/2023    Dyslipidemia 09/01/2023       Family History   Problem Relation Age of Onset    Diabetes Mother     Diabetes Sister           Objective:     /66 (BP Location: Left arm, Patient Position: Sitting, BP Cuff Size: Adult)   Pulse 70   Temp 36.6 °C (97.9 °F) (Temporal)   Resp 17   Ht 1.829 m (6')   Wt 112 kg (247 lb 11.2 oz)   SpO2 95%  Body mass index is 33.59 kg/m².    Physical Exam:  Physical Exam  Vitals reviewed.   Constitutional:       General: He is awake.      Appearance: Normal appearance. He is well-developed.   HENT:      Head: Normocephalic.   Eyes:      Conjunctiva/sclera: Conjunctivae normal.   Cardiovascular:      Rate and Rhythm: Normal rate.   Pulmonary:      Effort: Pulmonary effort is normal. No respiratory distress.   Musculoskeletal:      Cervical back: Neck supple.   Skin:     General: Skin is warm and dry.   Neurological:      Mental Status: He is alert and oriented to person, place, and time.   Psychiatric:         Mood and Affect: Mood normal.         Behavior: Behavior normal. Behavior is cooperative.              Assessment and Plan:     The following treatment plan was discussed:    Problem List Items Addressed This Visit       Heart failure with reduced ejection fraction (HCC)    Relevant Orders    Comp Metabolic Panel    Dyslipidemia    Relevant Orders    Lipid Profile    Comp Metabolic Panel    Type 2 diabetes mellitus with hyperglycemia, without long-term current use of insulin (HCC)      Latest Reference Range & Units 08/31/23 12:56   Glycohemoglobin 4.0 - 5.6 % 7.2 (H)   Estim. Avg Glu mg/dL 160   (H): Data is abnormally high    Ongoing- not fully controlled yet at this time. Patient does not want to use  metformin and is wanting to avoid any needle sticks. Did discuss Ozempic but can not do sub cutaneous injections. He is willing to use oral medication  Will start Rybelsus to help with glycemic control  Patient to follow up with me in 3 months for repeat A1c check.   Urine sample for Microalbumin creatinine ratio obtained         Relevant Medications    Semaglutide (RYBELSUS) 3 MG Tab     Other Visit Diagnoses       Hyperlipidemia, unspecified hyperlipidemia type        Relevant Orders    POCT Microalbumin Creat Ratio Urine (Completed)    Lipid Profile    Colon cancer screening        Relevant Orders    OCCULT BLOOD FECES IMMUNOASSAY            Any change or worsening of signs or symptoms, patient encouraged to follow-up or report to emergency room for further evaluation. Patient verbalizes understanding and agrees.    Follow-Up: Follow up in 3 months       PLEASE NOTE: This dictation was created using voice recognition software. I have made every reasonable attempt to correct obvious errors, but I expect that there are errors of grammar and possibly content that I did not discover before finalizing the note.

## 2023-09-27 NOTE — ASSESSMENT & PLAN NOTE
Latest Reference Range & Units 08/31/23 12:56   Glycohemoglobin 4.0 - 5.6 % 7.2 (H)   Estim. Avg Glu mg/dL 160   (H): Data is abnormally high    Ongoing- not fully controlled yet at this time. Patient does not want to use metformin and is wanting to avoid any needle sticks. Did discuss Ozempic but can not do sub cutaneous injections. He is willing to use oral medication  Will start Rybelsus to help with glycemic control  Patient to follow up with me in 3 months for repeat A1c check.   Urine sample for Microalbumin creatinine ratio obtained

## 2023-10-06 PROCEDURE — RXMED WILLOW AMBULATORY MEDICATION CHARGE

## 2023-10-06 PROCEDURE — RXMED WILLOW AMBULATORY MEDICATION CHARGE: Performed by: NURSE PRACTITIONER

## 2023-10-09 ENCOUNTER — PHARMACY VISIT (OUTPATIENT)
Dept: PHARMACY | Facility: MEDICAL CENTER | Age: 63
End: 2023-10-09
Payer: COMMERCIAL

## 2023-10-11 DIAGNOSIS — I50.20 HEART FAILURE WITH REDUCED EJECTION FRACTION (HCC): ICD-10-CM

## 2023-10-21 ENCOUNTER — APPOINTMENT (OUTPATIENT)
Dept: RADIOLOGY | Facility: MEDICAL CENTER | Age: 63
End: 2023-10-21
Attending: EMERGENCY MEDICINE
Payer: MEDICAID

## 2023-10-21 ENCOUNTER — HOSPITAL ENCOUNTER (EMERGENCY)
Facility: MEDICAL CENTER | Age: 63
End: 2023-10-21
Attending: EMERGENCY MEDICINE
Payer: MEDICAID

## 2023-10-21 VITALS
SYSTOLIC BLOOD PRESSURE: 167 MMHG | DIASTOLIC BLOOD PRESSURE: 79 MMHG | RESPIRATION RATE: 18 BRPM | OXYGEN SATURATION: 94 % | HEART RATE: 86 BPM | WEIGHT: 239.2 LBS | BODY MASS INDEX: 32.4 KG/M2 | TEMPERATURE: 96.8 F | HEIGHT: 72 IN

## 2023-10-21 DIAGNOSIS — H40.9 GLAUCOMA OF LEFT EYE, UNSPECIFIED GLAUCOMA TYPE: ICD-10-CM

## 2023-10-21 LAB
ANION GAP SERPL CALC-SCNC: 10 MMOL/L (ref 7–16)
BASOPHILS # BLD AUTO: 0.4 % (ref 0–1.8)
BASOPHILS # BLD: 0.04 K/UL (ref 0–0.12)
BUN SERPL-MCNC: 28 MG/DL (ref 8–22)
CALCIUM SERPL-MCNC: 9.3 MG/DL (ref 8.5–10.5)
CHLORIDE SERPL-SCNC: 105 MMOL/L (ref 96–112)
CO2 SERPL-SCNC: 25 MMOL/L (ref 20–33)
CREAT SERPL-MCNC: 1.27 MG/DL (ref 0.5–1.4)
EOSINOPHIL # BLD AUTO: 0.07 K/UL (ref 0–0.51)
EOSINOPHIL NFR BLD: 0.8 % (ref 0–6.9)
ERYTHROCYTE [DISTWIDTH] IN BLOOD BY AUTOMATED COUNT: 41.2 FL (ref 35.9–50)
GFR SERPLBLD CREATININE-BSD FMLA CKD-EPI: 64 ML/MIN/1.73 M 2
GLUCOSE SERPL-MCNC: 159 MG/DL (ref 65–99)
HCT VFR BLD AUTO: 48.8 % (ref 42–52)
HGB BLD-MCNC: 16.1 G/DL (ref 14–18)
IMM GRANULOCYTES # BLD AUTO: 0.02 K/UL (ref 0–0.11)
IMM GRANULOCYTES NFR BLD AUTO: 0.2 % (ref 0–0.9)
LYMPHOCYTES # BLD AUTO: 1.37 K/UL (ref 1–4.8)
LYMPHOCYTES NFR BLD: 15.3 % (ref 22–41)
MCH RBC QN AUTO: 29.7 PG (ref 27–33)
MCHC RBC AUTO-ENTMCNC: 33 G/DL (ref 32.3–36.5)
MCV RBC AUTO: 89.9 FL (ref 81.4–97.8)
MONOCYTES # BLD AUTO: 0.91 K/UL (ref 0–0.85)
MONOCYTES NFR BLD AUTO: 10.1 % (ref 0–13.4)
NEUTROPHILS # BLD AUTO: 6.56 K/UL (ref 1.82–7.42)
NEUTROPHILS NFR BLD: 73.2 % (ref 44–72)
NRBC # BLD AUTO: 0 K/UL
NRBC BLD-RTO: 0 /100 WBC (ref 0–0.2)
PLATELET # BLD AUTO: 233 K/UL (ref 164–446)
PMV BLD AUTO: 12.4 FL (ref 9–12.9)
POTASSIUM SERPL-SCNC: 4.4 MMOL/L (ref 3.6–5.5)
RBC # BLD AUTO: 5.43 M/UL (ref 4.7–6.1)
SODIUM SERPL-SCNC: 140 MMOL/L (ref 135–145)
WBC # BLD AUTO: 9 K/UL (ref 4.8–10.8)

## 2023-10-21 PROCEDURE — 700102 HCHG RX REV CODE 250 W/ 637 OVERRIDE(OP): Mod: UD | Performed by: EMERGENCY MEDICINE

## 2023-10-21 PROCEDURE — 700111 HCHG RX REV CODE 636 W/ 250 OVERRIDE (IP): Mod: UD | Performed by: EMERGENCY MEDICINE

## 2023-10-21 PROCEDURE — 96374 THER/PROPH/DIAG INJ IV PUSH: CPT

## 2023-10-21 PROCEDURE — 36415 COLL VENOUS BLD VENIPUNCTURE: CPT

## 2023-10-21 PROCEDURE — 85025 COMPLETE CBC W/AUTO DIFF WBC: CPT

## 2023-10-21 PROCEDURE — 70481 CT ORBIT/EAR/FOSSA W/DYE: CPT

## 2023-10-21 PROCEDURE — 99285 EMERGENCY DEPT VISIT HI MDM: CPT

## 2023-10-21 PROCEDURE — 700117 HCHG RX CONTRAST REV CODE 255: Mod: UD | Performed by: EMERGENCY MEDICINE

## 2023-10-21 PROCEDURE — 700101 HCHG RX REV CODE 250: Mod: UD | Performed by: EMERGENCY MEDICINE

## 2023-10-21 PROCEDURE — A9270 NON-COVERED ITEM OR SERVICE: HCPCS | Mod: UD | Performed by: EMERGENCY MEDICINE

## 2023-10-21 PROCEDURE — 80048 BASIC METABOLIC PNL TOTAL CA: CPT

## 2023-10-21 PROCEDURE — 70470 CT HEAD/BRAIN W/O & W/DYE: CPT

## 2023-10-21 RX ORDER — BRIMONIDINE TARTRATE 2 MG/ML
1 SOLUTION/ DROPS OPHTHALMIC ONCE
Status: COMPLETED | OUTPATIENT
Start: 2023-10-21 | End: 2023-10-21

## 2023-10-21 RX ORDER — DORZOLAMIDE HYDROCHLORIDE AND TIMOLOL MALEATE 20; 5 MG/ML; MG/ML
1 SOLUTION/ DROPS OPHTHALMIC 2 TIMES DAILY
Status: DISCONTINUED | OUTPATIENT
Start: 2023-10-21 | End: 2023-10-21

## 2023-10-21 RX ORDER — CARVEDILOL 12.5 MG/1
12.5 TABLET ORAL 2 TIMES DAILY WITH MEALS
Status: DISCONTINUED | OUTPATIENT
Start: 2023-10-21 | End: 2023-10-21 | Stop reason: HOSPADM

## 2023-10-21 RX ORDER — PROPARACAINE HYDROCHLORIDE 5 MG/ML
2 SOLUTION/ DROPS OPHTHALMIC ONCE
Status: COMPLETED | OUTPATIENT
Start: 2023-10-21 | End: 2023-10-21

## 2023-10-21 RX ORDER — ACETAZOLAMIDE 500 MG/5ML
500 INJECTION, POWDER, LYOPHILIZED, FOR SOLUTION INTRAVENOUS ONCE
Status: COMPLETED | OUTPATIENT
Start: 2023-10-21 | End: 2023-10-21

## 2023-10-21 RX ORDER — ACETAZOLAMIDE 250 MG/1
250 TABLET ORAL 4 TIMES DAILY
Qty: 28 TABLET | Refills: 0 | Status: SHIPPED | OUTPATIENT
Start: 2023-10-21 | End: 2023-10-28

## 2023-10-21 RX ORDER — DORZOLAMIDE HYDROCHLORIDE AND TIMOLOL MALEATE 20; 5 MG/ML; MG/ML
1 SOLUTION/ DROPS OPHTHALMIC ONCE
Status: COMPLETED | OUTPATIENT
Start: 2023-10-21 | End: 2023-10-21

## 2023-10-21 RX ADMIN — ACETAZOLAMIDE 500 MG: 500 INJECTION, POWDER, LYOPHILIZED, FOR SOLUTION INTRAVENOUS at 08:57

## 2023-10-21 RX ADMIN — BRIMONIDINE TARTRATE 1 DROP: 2 SOLUTION OPHTHALMIC at 09:11

## 2023-10-21 RX ADMIN — DORZOLAMIDE HYDROCHLORIDE AND TIMOLOL MALEATE 1 DROP: 20; 5 SOLUTION/ DROPS OPHTHALMIC at 09:11

## 2023-10-21 RX ADMIN — FLUORESCEIN SODIUM 1 MG: 1 STRIP OPHTHALMIC at 04:30

## 2023-10-21 RX ADMIN — IOHEXOL 80 ML: 350 INJECTION, SOLUTION INTRAVENOUS at 08:18

## 2023-10-21 RX ADMIN — CARVEDILOL 12.5 MG: 12.5 TABLET, FILM COATED ORAL at 10:40

## 2023-10-21 RX ADMIN — PROPARACAINE HYDROCHLORIDE 2 DROP: 5 SOLUTION/ DROPS OPHTHALMIC at 05:00

## 2023-10-21 ASSESSMENT — PAIN DESCRIPTION - PAIN TYPE: TYPE: ACUTE PAIN

## 2023-10-21 NOTE — DISCHARGE INSTRUCTIONS
Start taking the drops you were started on in the ED.    Dorzolamide-timolol 1 drop in the left eye twice daily morning and evening  Brimonidine 1 drop left eye 3 times per day.  Acetazolamide 250 mg 4 times daily    Please call the ophthalmologist, Dr. Rodriguez, information, office location is in your discharge papers.  She was contacted from the ER and will be expecting to see you early next week.  Please call on Monday to arrange an appointment time.

## 2023-10-21 NOTE — ED PROVIDER NOTES
"ED Provider Note    CHIEF COMPLAINT  Chief Complaint   Patient presents with    Red Eye     Since 3 days, he said he has vision problem on this eye  Noted redness at left eye and said there is pain sometimes, he said that something is in it    Headache     Since 3 days associated with neck pain    Denied any fever, chills, chest pain, shortness of breath         EXTERNAL RECORDS REVIEWED  Patient's last encounter was in the family medicine clinic September 19 of this year.  Patient was there to discuss evaluation and management of his diabetes he also has a known history of hyperlipidemia, heart failure.    Outpatient cardiology visit for management of his heart failure September 1 of this year.  He does have a known reduced ejection fraction, 30%    HPI/ROS  LIMITATION TO HISTORY   Select: : None  OUTSIDE HISTORIAN(S):  None    Bonifacio Adams Jr. is a 63 y.o. male who presents to the emergency department with a chief complaint of 3 days of eye irritation and a headache.  Patient states that he has had worsening vision in his left eye for months now.  He was told in [place] in 2015 that he had \"glaucoma\".  At that time, he states he was undergoing injections into his left eye.  In [place], in 2021, where he previously lived, he was referred to a specialist at a [facility]".  States he never followed up.  He states generally from his left eye he sees poorly, just \"shadows\".  For the last few days in his left eye his vision has been even worse, he cannot even see the light.  This occurred gradually.  He also reports that his left eye, now deviates to the left slightly and he is unsure, when that began happening.  He takes no eye medications.  No eye drops.  He denies any recent trauma.  He thinks he \"caught a cold\" in his eye and especially his irritation at night.  He uses readers glasses.  States he \"needs glasses\" but does not wear prescription glasses.  He does not wear contacts.  He denies any recent " trauma or falls.  He reports general irritation.  No specific foreign body.  The irritation is migratory.  No chest pain or shortness of breath.  No fever.  He has an appointment on November 21 with an eye doctor he states this was the soonest he could be seen.    PAST MEDICAL HISTORY   has a past medical history of Diabetes (HCC), Heart attack (HCC), Heart failure (HCC), and Hypertension.    SURGICAL HISTORY   has a past surgical history that includes abdominal exploration.    FAMILY HISTORY  Family History   Problem Relation Age of Onset    Diabetes Mother     Diabetes Sister        SOCIAL HISTORY  Social History     Tobacco Use    Smoking status: Former     Types: Cigarettes    Smokeless tobacco: Former   Vaping Use    Vaping Use: Former   Substance and Sexual Activity    Alcohol use: Not Currently    Drug use: Never    Sexual activity: Not Currently       CURRENT MEDICATIONS  Home Medications       Reviewed by Jeannette Unger R.N. (Registered Nurse) on 10/21/23 at 0240  Med List Status: Partial     Medication Last Dose Status   atorvastatin (LIPITOR) 40 MG Tab  Active   carvedilol (COREG) 12.5 MG Tab  Active   Empagliflozin (JARDIANCE) 25 MG Tab  Active   ENTRESTO 49-51 MG Tab  Active   ergocalciferol (DRISDOL) 93248 UNIT capsule  Active   furosemide (LASIX) 40 MG Tab  Active   meloxicam (MOBIC) 7.5 MG Tab  Active   potassium chloride SA (KDUR) 20 MEQ Tab CR  Active   Semaglutide (RYBELSUS) 3 MG Tab  Active                    ALLERGIES  Allergies   Allergen Reactions    Sulfa Drugs        PHYSICAL EXAM  VITAL SIGNS: BP (!) 167/79   Pulse 86   Temp 36 °C (96.8 °F) (Temporal)   Resp 18   Ht 1.829 m (6')   Wt 109 kg (239 lb 3.2 oz)   SpO2 94%   BMI 32.44 kg/m²    Vitals reviewed.  Constitutional: Patient is oriented to person, place, and time. Appears well-developed and well-nourished.  Mild distress.    Head: Normocephalic and atraumatic.  Mouth/Throat: Oropharynx is clear.  Eyes: Right eye conjunctiva,  normal.  The left conjunctiva is injected.  No subconjunctival hemorrhage.. Pupils are equal, round, and reactive to light. Left eye deviates laterally, mild. Fluorescein staining, reveals no uptake.  No foreign body.  No corneal abrasion.  Negative Concepción's sign.  No hyphema.  Right Eye pressures right 18, 19, 18 mmHg. Left eye 74, 75, 75mmHg.  Neck: Normal range of motion. Neck supple. No tracheal deviation present.  No meningeal signs.  Cardiovascular: Normal rate, regular rhythm and normal heart sounds.  Pulmonary/Chest: Effort normal and breath sounds normal. No respiratory distress.  Musculoskeletal: No edema and no tenderness.   Lymphadenopathy: No cervical adenopathy.   Neurological: No cranial nerve deficits. Normal motor and sensory exam. No focal deficits.   Skin: Skin is warm and dry. No erythema. No pallor.   Psychiatric: Patient has a normal mood and affect.     DIAGNOSTIC STUDIES / PROCEDURES    LABS  Results for orders placed or performed during the hospital encounter of 10/21/23   CBC WITH DIFFERENTIAL   Result Value Ref Range    WBC 9.0 4.8 - 10.8 K/uL    RBC 5.43 4.70 - 6.10 M/uL    Hemoglobin 16.1 14.0 - 18.0 g/dL    Hematocrit 48.8 42.0 - 52.0 %    MCV 89.9 81.4 - 97.8 fL    MCH 29.7 27.0 - 33.0 pg    MCHC 33.0 32.3 - 36.5 g/dL    RDW 41.2 35.9 - 50.0 fL    Platelet Count 233 164 - 446 K/uL    MPV 12.4 9.0 - 12.9 fL    Neutrophils-Polys 73.20 (H) 44.00 - 72.00 %    Lymphocytes 15.30 (L) 22.00 - 41.00 %    Monocytes 10.10 0.00 - 13.40 %    Eosinophils 0.80 0.00 - 6.90 %    Basophils 0.40 0.00 - 1.80 %    Immature Granulocytes 0.20 0.00 - 0.90 %    Nucleated RBC 0.00 0.00 - 0.20 /100 WBC    Neutrophils (Absolute) 6.56 1.82 - 7.42 K/uL    Lymphs (Absolute) 1.37 1.00 - 4.80 K/uL    Monos (Absolute) 0.91 (H) 0.00 - 0.85 K/uL    Eos (Absolute) 0.07 0.00 - 0.51 K/uL    Baso (Absolute) 0.04 0.00 - 0.12 K/uL    Immature Granulocytes (abs) 0.02 0.00 - 0.11 K/uL    NRBC (Absolute) 0.00 K/uL   Basic  Metabolic Panel   Result Value Ref Range    Sodium 140 135 - 145 mmol/L    Potassium 4.4 3.6 - 5.5 mmol/L    Chloride 105 96 - 112 mmol/L    Co2 25 20 - 33 mmol/L    Glucose 159 (H) 65 - 99 mg/dL    Bun 28 (H) 8 - 22 mg/dL    Creatinine 1.27 0.50 - 1.40 mg/dL    Calcium 9.3 8.5 - 10.5 mg/dL    Anion Gap 10.0 7.0 - 16.0   ESTIMATED GFR   Result Value Ref Range    GFR (CKD-EPI) 64 >60 mL/min/1.73 m 2     RADIOLOGY  I have independently interpreted the diagnostic imaging associated with this visit and am waiting the final reading from the radiologist.   My preliminary interpretation is as follows: No retrobulbar hematoma, no mass, no intracranial bleeding  Radiologist interpretation:   CT-HEAD WITH & W/O   Final Result      Head CT without and with contrast within normal limits. No evidence of acute cerebral infarction, hemorrhage, mass lesion, or abnormal enhancement.      [id number] WITH PLUS RECONS   Final Result      CT of the orbits with contrast within normal limits.          COURSE & MEDICAL DECISION MAKING    ED Observation Status? Yes; I am placing the patient in to an observation status due to a diagnostic uncertainty as well as therapeutic intensity. Patient placed in observation status at 4:16 AM, 10/21/2023.     Observation plan is as follows: Due to diagnostic uncertainty, need for further advanced imaging, possible admission versus outpatient follow-up with ophthalmology, patient's placed in the ED observation    Upon Reevaluation, the patient's condition has: Improved; and will be discharged.    Patient discharged from ED Observation status at 12PM (Time) 10/21, 2023 (Date).     INITIAL ASSESSMENT, COURSE AND PLAN  Care Narrative:   This very pleasant 63-year-old male.  At baseline, he has poor vision from his left eye.  He has a long history of nearly 10 years of what sounds like advanced glaucoma that has essentially been untreated for the last 8 years.  Over the last few days days, he has had  gradual progression to complete vision loss in the left eye.  He is also reporting eye pain, left eye deviation which he is uncertain when it started.  His pressures in the left eye are markedly elevated.  We will obtain imaging to assess for other pathology.  Patient will need eye consultation ophthalmology as well.    6:47 AM data reviewed White blood cell count is normal.  H&H 16 and 48.  Normal platelet count.  Chemistry shows an elevated glucose of 158 and elevated BUN the remainder of the BMP is normal.  Await imaging.    8:38 AM ophthalmology, Dr. Rodriguez, X paged.    8:40 AM D/W Dr. Rodriguez, ophthalmology on-call regarding this patient's acute on chronic condition.  We discussed CT findings lab results including kidney function.  She is recommending 500 mg of acetazolamide as well as dorzolamide/timolol and brimonidine drops into the left eye.  Giving the acetazolamide about 30 minutes to lower the pressure.  If, were able to lower the pressure to make the patient more comfortable even though it may not be normal, this would be acceptable given the chronic condition.  She will plan to follow-up with the patient early next week.  Patient is updated on this plan.    10:34 AM repeat eye pressures are somewhat improved, 65 mmHg, left eye.  Patient reports an improvement in his pain from a height of 10 8/10 down to 5/10.  Additional dorzolamide, timolol, brimonidine instilled into the left eye.    11:32 AM patient's reevaluated.  He is now reporting complete resolution in his eye pain.  Reporting huge relief.  Repeat eye pressures only slightly down from prior, 60 mmHg.  No change in his vision.  He is given the bottles of dorzolamide-timolol and brimonidine to take home.  He is advised both on the bottles as well as in his discharge instructions, how to take these medications.  Prescription for acetazolamide 4 times daily is also provided.  I have verbally explained this to him.  He voices understanding of all of these  instructions.  He will call Dr. Rodriguez's office on Monday morning to arrange follow-up early next week.  His blood pressure is improved.  He is grateful for the care here.  He is given return precautions.  I am hopeful that he will regain some vision in that eye.  Patient understands that this may not be the case.  He is overall well-appearing and nontoxic.  He is discharged home in stable condition.     DISPOSITION AND DISCUSSIONS  I have discussed management of the patient with the following physicians and EDWIN's: Ophthalmology    Discussion of management with other QHP or appropriate source(s): None    Escalation of care considered, and ultimately not performed:acute inpatient care management, however at this time, the patient is most appropriate for outpatient management    Barriers to care at this time, including but not limited to:  None .     Decision tools and prescription drugs considered including, but not limited to: None    FINAL DIAGNOSIS  1. Glaucoma of left eye, unspecified glaucoma type    Acute on chronic, untreated       Electronically signed by: Anabel Mccray D.O., 10/21/2023 3:55 AM

## 2023-10-21 NOTE — ED NOTES
Report received at bedside from SANA Fonseca.    Patient A & O x 4, GCS 15. Complaining of slight headache 2/10. Assisted into position of comfort on gurney. Denies needs at this time. Call bell within reach. Updated regarding plan of care - awaiting CT scan.
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
## 2023-10-27 ENCOUNTER — APPOINTMENT (OUTPATIENT)
Dept: CARDIOLOGY | Facility: MEDICAL CENTER | Age: 63
End: 2023-10-27
Attending: INTERNAL MEDICINE
Payer: MEDICAID

## 2023-10-30 ENCOUNTER — PHARMACY VISIT (OUTPATIENT)
Dept: PHARMACY | Facility: MEDICAL CENTER | Age: 63
End: 2023-10-30
Payer: COMMERCIAL

## 2023-10-30 PROCEDURE — RXMED WILLOW AMBULATORY MEDICATION CHARGE: Performed by: STUDENT IN AN ORGANIZED HEALTH CARE EDUCATION/TRAINING PROGRAM

## 2023-10-30 RX ORDER — PREDNISOLONE ACETATE 10 MG/ML
SUSPENSION/ DROPS OPHTHALMIC
Qty: 5 ML | Refills: 1 | Status: SHIPPED | OUTPATIENT
Start: 2023-10-30 | End: 2023-12-05

## 2023-11-17 ENCOUNTER — PHARMACY VISIT (OUTPATIENT)
Dept: PHARMACY | Facility: MEDICAL CENTER | Age: 63
End: 2023-11-17
Payer: COMMERCIAL

## 2023-11-17 PROCEDURE — RXMED WILLOW AMBULATORY MEDICATION CHARGE: Performed by: STUDENT IN AN ORGANIZED HEALTH CARE EDUCATION/TRAINING PROGRAM

## 2023-11-17 RX ORDER — BRIMONIDINE TARTRATE 2 MG/ML
SOLUTION/ DROPS OPHTHALMIC
Qty: 15 ML | Refills: 3 | Status: SHIPPED | OUTPATIENT
Start: 2023-11-17 | End: 2023-12-05

## 2023-11-17 RX ORDER — DORZOLAMIDE HYDROCHLORIDE AND TIMOLOL MALEATE 20; 5 MG/ML; MG/ML
SOLUTION/ DROPS OPHTHALMIC
Qty: 10 ML | Refills: 3 | Status: SHIPPED | OUTPATIENT
Start: 2023-11-17 | End: 2023-12-05

## 2023-11-26 PROCEDURE — RXMED WILLOW AMBULATORY MEDICATION CHARGE: Performed by: NURSE PRACTITIONER

## 2023-11-26 PROCEDURE — RXMED WILLOW AMBULATORY MEDICATION CHARGE

## 2023-11-28 ENCOUNTER — TELEPHONE (OUTPATIENT)
Dept: CARDIOLOGY | Facility: MEDICAL CENTER | Age: 63
End: 2023-11-28
Payer: MEDICAID

## 2023-11-28 NOTE — TELEPHONE ENCOUNTER
Called patient in regards to his echo that was order on his last visit could not reach patient phone number  keeps giving a busy tone.

## 2023-12-01 ENCOUNTER — PHARMACY VISIT (OUTPATIENT)
Dept: PHARMACY | Facility: MEDICAL CENTER | Age: 63
End: 2023-12-01
Payer: COMMERCIAL

## 2023-12-05 ENCOUNTER — OFFICE VISIT (OUTPATIENT)
Dept: CARDIOLOGY | Facility: MEDICAL CENTER | Age: 63
End: 2023-12-05
Attending: INTERNAL MEDICINE
Payer: MEDICAID

## 2023-12-05 VITALS
HEIGHT: 72 IN | DIASTOLIC BLOOD PRESSURE: 70 MMHG | WEIGHT: 228 LBS | OXYGEN SATURATION: 94 % | SYSTOLIC BLOOD PRESSURE: 126 MMHG | HEART RATE: 90 BPM | BODY MASS INDEX: 30.88 KG/M2

## 2023-12-05 DIAGNOSIS — E78.5 DYSLIPIDEMIA: ICD-10-CM

## 2023-12-05 DIAGNOSIS — I25.10 CORONARY ARTERY DISEASE INVOLVING NATIVE CORONARY ARTERY OF NATIVE HEART WITHOUT ANGINA PECTORIS: ICD-10-CM

## 2023-12-05 DIAGNOSIS — I10 ESSENTIAL HYPERTENSION, BENIGN: ICD-10-CM

## 2023-12-05 DIAGNOSIS — I50.20 HEART FAILURE WITH REDUCED EJECTION FRACTION (HCC): ICD-10-CM

## 2023-12-05 PROCEDURE — 3078F DIAST BP <80 MM HG: CPT | Performed by: INTERNAL MEDICINE

## 2023-12-05 PROCEDURE — 99212 OFFICE O/P EST SF 10 MIN: CPT | Performed by: INTERNAL MEDICINE

## 2023-12-05 PROCEDURE — 3074F SYST BP LT 130 MM HG: CPT | Performed by: INTERNAL MEDICINE

## 2023-12-05 PROCEDURE — 99213 OFFICE O/P EST LOW 20 MIN: CPT | Performed by: INTERNAL MEDICINE

## 2023-12-05 PROCEDURE — 93005 ELECTROCARDIOGRAM TRACING: CPT | Performed by: INTERNAL MEDICINE

## 2023-12-05 NOTE — ASSESSMENT & PLAN NOTE
Regarding his noted history of coronary artery disease and on his nonobstructive coronary artery disease he remains on appropriate medical therapy for ongoing primary prevention

## 2023-12-05 NOTE — PATIENT INSTRUCTIONS
Follow up in 6 months  Check an echocardiogram  Continue current medications  Call with questions.
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
## 2023-12-05 NOTE — ASSESSMENT & PLAN NOTE
Clinically, he is doing well and does not have any overt signs of decompensated heart failure.  He remains on appropriate medical therapy for his noted systolic heart failure.  At this time no changes are made to his medical therapy.

## 2023-12-07 LAB — EKG IMPRESSION: NORMAL

## 2023-12-07 PROCEDURE — 93010 ELECTROCARDIOGRAM REPORT: CPT | Performed by: INTERNAL MEDICINE

## 2023-12-29 ENCOUNTER — APPOINTMENT (OUTPATIENT)
Dept: MEDICAL GROUP | Facility: MEDICAL CENTER | Age: 63
End: 2023-12-29
Payer: MEDICAID

## 2023-12-29 DIAGNOSIS — E11.65 TYPE 2 DIABETES MELLITUS WITH HYPERGLYCEMIA, WITHOUT LONG-TERM CURRENT USE OF INSULIN (HCC): ICD-10-CM

## 2024-01-04 RX ORDER — EMPAGLIFLOZIN 25 MG/1
1 TABLET, FILM COATED ORAL DAILY
Qty: 30 TABLET | Refills: 0 | Status: SHIPPED | OUTPATIENT
Start: 2024-01-04

## 2024-01-10 ENCOUNTER — APPOINTMENT (OUTPATIENT)
Dept: MEDICAL GROUP | Facility: MEDICAL CENTER | Age: 64
End: 2024-01-10
Payer: COMMERCIAL

## 2024-01-23 ENCOUNTER — APPOINTMENT (OUTPATIENT)
Dept: CARDIOLOGY | Facility: MEDICAL CENTER | Age: 64
End: 2024-01-23
Attending: INTERNAL MEDICINE

## 2024-06-05 ENCOUNTER — APPOINTMENT (OUTPATIENT)
Dept: CARDIOLOGY | Facility: MEDICAL CENTER | Age: 64
End: 2024-06-05
Attending: INTERNAL MEDICINE
Payer: COMMERCIAL